# Patient Record
Sex: MALE | Race: WHITE | NOT HISPANIC OR LATINO | Employment: FULL TIME | URBAN - METROPOLITAN AREA
[De-identification: names, ages, dates, MRNs, and addresses within clinical notes are randomized per-mention and may not be internally consistent; named-entity substitution may affect disease eponyms.]

---

## 2020-08-25 ENCOUNTER — HISTORICAL (OUTPATIENT)
Dept: ENDOSCOPY | Facility: HOSPITAL | Age: 55
End: 2020-08-25

## 2020-09-24 ENCOUNTER — HISTORICAL (OUTPATIENT)
Dept: RADIOLOGY | Facility: HOSPITAL | Age: 55
End: 2020-09-24

## 2020-09-24 LAB — POC CREATININE: 1.1 MG/DL (ref 0.6–1.3)

## 2022-05-27 ENCOUNTER — OFFICE VISIT (OUTPATIENT)
Dept: ORTHOPEDICS | Facility: CLINIC | Age: 57
End: 2022-05-27
Payer: OTHER GOVERNMENT

## 2022-05-27 VITALS
SYSTOLIC BLOOD PRESSURE: 119 MMHG | DIASTOLIC BLOOD PRESSURE: 84 MMHG | BODY MASS INDEX: 35.79 KG/M2 | HEIGHT: 70 IN | HEART RATE: 63 BPM | WEIGHT: 250 LBS

## 2022-05-27 DIAGNOSIS — M50.20 CERVICAL DISC DISPLACEMENT: ICD-10-CM

## 2022-05-27 DIAGNOSIS — M54.12 CERVICAL RADICULITIS: Primary | ICD-10-CM

## 2022-05-27 DIAGNOSIS — M54.2 CERVICALGIA: Chronic | ICD-10-CM

## 2022-05-27 DIAGNOSIS — M48.02 CERVICAL SPINAL STENOSIS: ICD-10-CM

## 2022-05-27 DIAGNOSIS — M54.12 CERVICAL RADICULITIS: Primary | Chronic | ICD-10-CM

## 2022-05-27 DIAGNOSIS — M54.2 CERVICALGIA: ICD-10-CM

## 2022-05-27 DIAGNOSIS — M48.02 CERVICAL SPINAL STENOSIS: Chronic | ICD-10-CM

## 2022-05-27 DIAGNOSIS — M50.20 CERVICAL DISC DISPLACEMENT: Chronic | ICD-10-CM

## 2022-05-27 PROCEDURE — 99203 OFFICE O/P NEW LOW 30 MIN: CPT | Mod: ,,, | Performed by: ANESTHESIOLOGY

## 2022-05-27 PROCEDURE — 99203 PR OFFICE/OUTPT VISIT, NEW, LEVL III, 30-44 MIN: ICD-10-PCS | Mod: ,,, | Performed by: ANESTHESIOLOGY

## 2022-05-27 RX ORDER — AMLODIPINE BESYLATE 10 MG/1
10 TABLET ORAL
COMMUNITY

## 2022-05-27 RX ORDER — PANTOPRAZOLE SODIUM 40 MG/1
40 TABLET, DELAYED RELEASE ORAL
COMMUNITY

## 2022-05-27 RX ORDER — FENOFIBRATE 145 MG/1
1800 TABLET, FILM COATED ORAL
COMMUNITY

## 2022-05-27 RX ORDER — ROSUVASTATIN CALCIUM 40 MG/1
20 TABLET, COATED ORAL
COMMUNITY

## 2022-05-27 NOTE — PROGRESS NOTES
Petra Burkett MD        PATIENT NAME: Tani Mehta Jr.  : 1965  DATE: 22  MRN: [id number]      Billing Provider: Petra Burkett MD  Level of Service:   Patient PCP Information     Provider PCP Type    Ramón Granda MD General          Reason for Visit / Chief Complaint: Back Pain ([facility]; [facility] & neck pain. Pt states no prior injury/sx; pain started by 3 years ago with some neck pain radiating to shoulder and spine. Pt reports some numbness in arms mostly left, has tried OTC NSAIDs. Pain level 6 out of of 10. )       Update PCP  Update Chief Complaint         History of Present Illness / Problem Focused Workflow     Tani Mehta Jr. presents to the clinic with Back Pain ([facility]; back & neck pain. Pt states no prior injury/sx; pain started by 3 years ago with some neck pain radiating to shoulder and spine. Pt reports some numbness in arms mostly left, has tried OTC NSAIDs. Pain level 6 out of of 10. )     Neck pain for 3 years, no injury  Now radiating down LUE with daily numbness and tingling into left hand and all fingers; every now and then down RUE  OTC oral NSAIDs, topical Aleve, and Voltaren gel have not helped  Heating pad helps some  6/10 now; sometimes has no pain when he first wakes up; 9/10 worst  No previous neck surgery or injections, PT    Back Pain        Review of Systems     Review of Systems   Musculoskeletal: Positive for back pain, neck pain and neck stiffness.        Medical / Social / Family History     Past Medical History:   Diagnosis Date    Adrenal adenoma     Diverticulosis     DJD (degenerative joint disease)     Fatty liver     Hepatic cyst     HTN (hypertension) 2014    Hyperlipidemia     Hypertension     Hypothyroidism        Past Surgical History:   Procedure Laterality Date    MOUTH SURGERY         Social History  Mr. Mehta  reports that he has quit smoking. He has never used smokeless tobacco. He reports current alcohol use.  He reports that he does not use drugs.    Family History  'zaid Mehta family history includes Bladder Cancer in his brother; Breast cancer in his mother; Cancer in his mother; Heart disease in his father, maternal grandfather, and paternal grandmother; Hypertension in his brother, father, maternal grandfather, mother, paternal grandfather, and paternal grandmother; Lung cancer in his paternal grandfather; Multiple myeloma in his father; Parkinsonism in his mother; Pulmonary fibrosis in his maternal grandfather; Stroke in his maternal grandmother; Thyroid disease in his mother.    Medications and Allergies     Medications  Outpatient Medications Marked as Taking for the 5/27/22 encounter (Office Visit) with Petra Burkett MD   Medication Sig Dispense Refill    amLODIPine (NORVASC) 10 MG tablet Take 10 mg by mouth.      ATENOLOL ORAL Take 50 mg by mouth.      fenofibrate (TRICOR) 145 MG tablet Take 145 mg by mouth.      levothyroxine (SYNTHROID) 50 MCG tablet Take 50 mcg by mouth once daily.  4    pantoprazole (PROTONIX) 40 MG tablet Take 40 mg by mouth.      rosuvastatin (CRESTOR) 40 MG Tab Take 20 mg by mouth.         Allergies  Review of patient's allergies indicates:  No Known Allergies    Physical Examination     Vitals:    05/27/22 0949   BP: 119/84   Pulse: 63     Spine Musculoskeletal Exam    General        Constitutional: appears stated age, well-developed and well-nourished    Scleral icterus: no    Labored breathing: no    Psychiatric: normal mood and affect and no acute distress    Neurological: alert and oriented x3    Skin: intact    Inspection        Cervical Spine      Cervical spine inspection is normal.    Palpation      Cervical Spine      Masses: none    Spasms: none    Crepitus: none    Upper extremity muscle tone: normal    Tenderness: present      Spinous process: mid cervical and lower cervical    Range of Motion      Cervical Spine      Cervical flexion: 1-2 finger breadths     Cervical flexion - associated detail: pain    Cervical extension: reduced extension (51-75%)    Cervical extension - associated detail: pain    Strength      Cervical Spine      Cervical spine motor exam is normal.    Sensory      Cervical Spine      Cervical spine sensation is normal.       Assessment and Plan (including Health Maintenance)      Problem List  Smart Sets  Document Outside HM   :    Plan:   Cervical radiculitis    Cervicalgia    Cervical spinal stenosis    Cervical disc displacement       Schedule SLIM (C7-T1).    Problem List Items Addressed This Visit        Orthopedic Problems    Cervical disc displacement       Other    Cervical radiculitis - Primary    Cervicalgia    Cervical spinal stenosis            No future appointments.     There are no Patient Instructions on file for this visit.  No follow-ups on file.     Signature:  Petra Burkett MD      Date of encounter: 5/27/22

## 2022-05-27 NOTE — H&P (VIEW-ONLY)
Petra Burkett MD        PATIENT NAME: Tani Mehta Jr.  : 1965  DATE: 22  MRN: [id number]      Billing Provider: Petra Burkett MD  Level of Service:   Patient PCP Information     Provider PCP Type    Ramón Granda MD General          Reason for Visit / Chief Complaint: Back Pain ([facility]; back & [facility] pain. Pt states no prior injury/sx; pain started by 3 years ago with some neck pain radiating to shoulder and spine. Pt reports some numbness in arms mostly left, has tried OTC NSAIDs. Pain level 6 out of of 10. )       Update PCP  Update Chief Complaint         History of Present Illness / Problem Focused Workflow     Tani Mehta Jr. presents to the clinic with Back Pain ([facility]; back & neck pain. Pt states no prior injury/sx; pain started by 3 years ago with some neck pain radiating to shoulder and spine. Pt reports some numbness in arms mostly left, has tried OTC NSAIDs. Pain level 6 out of of 10. )     Neck pain for 3 years, no injury  Now radiating down LUE with daily numbness and tingling into left hand and all fingers; every now and then down RUE  OTC oral NSAIDs, topical Aleve, and Voltaren gel have not helped  Heating pad helps some  6/10 now; sometimes has no pain when he first wakes up; 9/10 worst  No previous neck surgery or injections, PT    Back Pain        Review of Systems     Review of Systems   Musculoskeletal: Positive for back pain, neck pain and neck stiffness.        Medical / Social / Family History     Past Medical History:   Diagnosis Date    Adrenal adenoma     Diverticulosis     DJD (degenerative joint disease)     Fatty liver     Hepatic cyst     HTN (hypertension) 2014    Hyperlipidemia     Hypertension     Hypothyroidism        Past Surgical History:   Procedure Laterality Date    MOUTH SURGERY         Social History  Mr. Mehta  reports that he has quit smoking. He has never used smokeless tobacco. He reports current alcohol use.  He reports that he does not use drugs.    Family History  'zaid Mehta family history includes Bladder Cancer in his brother; Breast cancer in his mother; Cancer in his mother; Heart disease in his father, maternal grandfather, and paternal grandmother; Hypertension in his brother, father, maternal grandfather, mother, paternal grandfather, and paternal grandmother; Lung cancer in his paternal grandfather; Multiple myeloma in his father; Parkinsonism in his mother; Pulmonary fibrosis in his maternal grandfather; Stroke in his maternal grandmother; Thyroid disease in his mother.    Medications and Allergies     Medications  Outpatient Medications Marked as Taking for the 5/27/22 encounter (Office Visit) with Petra Burkett MD   Medication Sig Dispense Refill    amLODIPine (NORVASC) 10 MG tablet Take 10 mg by mouth.      ATENOLOL ORAL Take 50 mg by mouth.      fenofibrate (TRICOR) 145 MG tablet Take 145 mg by mouth.      levothyroxine (SYNTHROID) 50 MCG tablet Take 50 mcg by mouth once daily.  4    pantoprazole (PROTONIX) 40 MG tablet Take 40 mg by mouth.      rosuvastatin (CRESTOR) 40 MG Tab Take 20 mg by mouth.         Allergies  Review of patient's allergies indicates:  No Known Allergies    Physical Examination     Vitals:    05/27/22 0949   BP: 119/84   Pulse: 63     Spine Musculoskeletal Exam    General        Constitutional: appears stated age, well-developed and well-nourished    Scleral icterus: no    Labored breathing: no    Psychiatric: normal mood and affect and no acute distress    Neurological: alert and oriented x3    Skin: intact    Inspection        Cervical Spine      Cervical spine inspection is normal.    Palpation      Cervical Spine      Masses: none    Spasms: none    Crepitus: none    Upper extremity muscle tone: normal    Tenderness: present      Spinous process: mid cervical and lower cervical    Range of Motion      Cervical Spine      Cervical flexion: 1-2 finger breadths     Cervical flexion - associated detail: pain    Cervical extension: reduced extension (51-75%)    Cervical extension - associated detail: pain    Strength      Cervical Spine      Cervical spine motor exam is normal.    Sensory      Cervical Spine      Cervical spine sensation is normal.       Assessment and Plan (including Health Maintenance)      Problem List  Smart Sets  Document Outside HM   :    Plan:   Cervical radiculitis    Cervicalgia    Cervical spinal stenosis    Cervical disc displacement       Schedule SLIM (C7-T1).    Problem List Items Addressed This Visit        Orthopedic Problems    Cervical disc displacement       Other    Cervical radiculitis - Primary    Cervicalgia    Cervical spinal stenosis            No future appointments.     There are no Patient Instructions on file for this visit.  No follow-ups on file.     Signature:  Petra Burkett MD      Date of encounter: 5/27/22

## 2022-06-07 ENCOUNTER — LAB VISIT (OUTPATIENT)
Dept: LAB | Facility: HOSPITAL | Age: 57
End: 2022-06-07
Attending: ANESTHESIOLOGY
Payer: OTHER GOVERNMENT

## 2022-06-07 DIAGNOSIS — Z01.818 PRE-OP TESTING: Primary | ICD-10-CM

## 2022-06-07 LAB — SARS-COV-2 RDRP RESP QL NAA+PROBE: NEGATIVE

## 2022-06-07 PROCEDURE — 87635 SARS-COV-2 COVID-19 AMP PRB: CPT

## 2022-06-07 NOTE — DISCHARGE INSTRUCTIONS
MEDIAL BRANCH BLOCK/ EPIDURAL STEROID INJECTION, CARE AFTER                                                                                    NO HEAVY LIFTING FOR ONE WEEK                                                                                  NO HEAT FOR 24 HOURS                                                                                  APPLY ICE PACK FOR COMFORT TO SITES                                                                                  REMOVE BAND-AIDS TOMORROW AND THEN YOU MAY SHOWER                                                                                  NO TUB SOAKING FOR 3-5 DAYS      These instructions give you information on caring for yourself after your procedure. Your doctor may also give you specific instructions. Call your doctor if you have any problems or questions after your procedure.     HOME CARE  Do not drive or use any machinery for the next 24 hours.  Do not do any hard physical activity for the next 24 hours  Do not use a heating pad in area of injection  You may go back to eating as usual    SIDE EFFECTS THAT COULD HAPPEN UP TO 4 HOURS AFTER THE INJECTION  If you have leg weakness or numbness, have someone help you walk. If the weakness or numbness does not go away, or if it gets worse go to the emergency department.  If you have dizziness, lie down right away. This usually helps. Sit up slowly and then when you have been sitting for a few minutes then stand up.  If you have a mild headache, drink fluids (especially drinks with caffeine) Call your doctor if the headache gets worse or persists.  When the numbing medicine wears off you may feel some discomfort where you had the shot. It usually only lasts for a few days. You may put ice over the injection site. Leave ice on for 20 minutes at a time and protect your skin during use.   You may feel minor back pain and stiffness at the site of the shot. Call your doctor if this pain gets worse or does not  improve. You may feel nauseous or vomit several hours after your procedure. If this happens, try drinking small amounts of clear liquids until you feel better. If you continue to feel nauseated or continue vomiting, get help right away.    Steroids may take several days to start working. The shot usually helps leg pain more than back pain. The shot will not fix what is causing the pain but may take away some of the pain. This pain relief may last from 2 weeks to 6 months.     GET HELP RIGHT AWAY IF:  You have very bad pain, headache or neck pain or stiffness  There is a change in your vision (double or blurry vision)  You have a fever over 101 or chills  You have swelling or redness at the injection site  You get weaker  You are not able to control your bladder or bowels  You are not able to urinate

## 2022-06-08 ENCOUNTER — ANESTHESIA EVENT (OUTPATIENT)
Dept: SURGERY | Facility: HOSPITAL | Age: 57
End: 2022-06-08
Payer: OTHER GOVERNMENT

## 2022-06-08 ENCOUNTER — ANESTHESIA (OUTPATIENT)
Dept: SURGERY | Facility: HOSPITAL | Age: 57
End: 2022-06-08
Payer: OTHER GOVERNMENT

## 2022-06-08 ENCOUNTER — HOSPITAL ENCOUNTER (OUTPATIENT)
Facility: HOSPITAL | Age: 57
Discharge: HOME | End: 2022-06-08
Attending: ANESTHESIOLOGY | Admitting: ANESTHESIOLOGY
Payer: OTHER GOVERNMENT

## 2022-06-08 DIAGNOSIS — M54.9 CHRONIC BACK PAIN GREATER THAN 3 MONTHS DURATION: ICD-10-CM

## 2022-06-08 DIAGNOSIS — G89.29 CHRONIC BACK PAIN GREATER THAN 3 MONTHS DURATION: ICD-10-CM

## 2022-06-08 PROCEDURE — 25000003 PHARM REV CODE 250: Performed by: NURSE ANESTHETIST, CERTIFIED REGISTERED

## 2022-06-08 PROCEDURE — 63600175 PHARM REV CODE 636 W HCPCS: Performed by: ANESTHESIOLOGY

## 2022-06-08 PROCEDURE — 62321 NJX INTERLAMINAR CRV/THRC: CPT | Performed by: ANESTHESIOLOGY

## 2022-06-08 PROCEDURE — 62321 PR INJ CERV/THORAC, W/GUIDANCE: ICD-10-PCS | Mod: ,,, | Performed by: ANESTHESIOLOGY

## 2022-06-08 PROCEDURE — 25000003 PHARM REV CODE 250: Performed by: ANESTHESIOLOGY

## 2022-06-08 PROCEDURE — 62321 NJX INTERLAMINAR CRV/THRC: CPT | Mod: ,,, | Performed by: ANESTHESIOLOGY

## 2022-06-08 PROCEDURE — 01992 ANES DX/THER NRV BLK&INJ PRN: CPT | Performed by: ANESTHESIOLOGY

## 2022-06-08 PROCEDURE — A4216 STERILE WATER/SALINE, 10 ML: HCPCS | Performed by: ANESTHESIOLOGY

## 2022-06-08 PROCEDURE — 25500020 PHARM REV CODE 255: Performed by: ANESTHESIOLOGY

## 2022-06-08 PROCEDURE — 37000008 HC ANESTHESIA 1ST 15 MINUTES: Performed by: ANESTHESIOLOGY

## 2022-06-08 PROCEDURE — 63600175 PHARM REV CODE 636 W HCPCS: Performed by: NURSE ANESTHETIST, CERTIFIED REGISTERED

## 2022-06-08 RX ORDER — LIDOCAINE HYDROCHLORIDE 10 MG/ML
INJECTION, SOLUTION EPIDURAL; INFILTRATION; INTRACAUDAL; PERINEURAL
Status: DISCONTINUED | OUTPATIENT
Start: 2022-06-08 | End: 2022-06-08 | Stop reason: HOSPADM

## 2022-06-08 RX ORDER — LIDOCAINE HYDROCHLORIDE 20 MG/ML
INJECTION, SOLUTION EPIDURAL; INFILTRATION; INTRACAUDAL; PERINEURAL
Status: DISCONTINUED | OUTPATIENT
Start: 2022-06-08 | End: 2022-06-08

## 2022-06-08 RX ORDER — IOPAMIDOL 408 MG/ML
1.5 INJECTION, SOLUTION INTRATHECAL
Status: COMPLETED | OUTPATIENT
Start: 2022-06-08 | End: 2022-06-08

## 2022-06-08 RX ORDER — LIDOCAINE HYDROCHLORIDE 10 MG/ML
1 INJECTION, SOLUTION EPIDURAL; INFILTRATION; INTRACAUDAL; PERINEURAL ONCE
Status: DISCONTINUED | OUTPATIENT
Start: 2022-06-08 | End: 2022-06-08 | Stop reason: HOSPADM

## 2022-06-08 RX ORDER — DEXAMETHASONE SODIUM PHOSPHATE 10 MG/ML
INJECTION INTRAMUSCULAR; INTRAVENOUS
Status: DISCONTINUED | OUTPATIENT
Start: 2022-06-08 | End: 2022-06-08 | Stop reason: HOSPADM

## 2022-06-08 RX ORDER — SODIUM CHLORIDE, SODIUM GLUCONATE, SODIUM ACETATE, POTASSIUM CHLORIDE AND MAGNESIUM CHLORIDE 30; 37; 368; 526; 502 MG/100ML; MG/100ML; MG/100ML; MG/100ML; MG/100ML
1000 INJECTION, SOLUTION INTRAVENOUS CONTINUOUS
Status: DISCONTINUED | OUTPATIENT
Start: 2022-06-08 | End: 2022-06-08 | Stop reason: HOSPADM

## 2022-06-08 RX ORDER — PROPOFOL 10 MG/ML
VIAL (ML) INTRAVENOUS
Status: DISCONTINUED | OUTPATIENT
Start: 2022-06-08 | End: 2022-06-08

## 2022-06-08 RX ORDER — DEXAMETHASONE SODIUM PHOSPHATE 10 MG/ML
INJECTION INTRAMUSCULAR; INTRAVENOUS
Status: DISCONTINUED
Start: 2022-06-08 | End: 2022-06-08 | Stop reason: HOSPADM

## 2022-06-08 RX ORDER — LIDOCAINE HYDROCHLORIDE 10 MG/ML
INJECTION, SOLUTION EPIDURAL; INFILTRATION; INTRACAUDAL; PERINEURAL
Status: DISCONTINUED
Start: 2022-06-08 | End: 2022-06-08 | Stop reason: HOSPADM

## 2022-06-08 RX ORDER — ASPIRIN 81 MG/1
81 TABLET ORAL DAILY
Status: ON HOLD | COMMUNITY
End: 2022-07-13 | Stop reason: CLARIF

## 2022-06-08 RX ORDER — SODIUM CHLORIDE 9 MG/ML
INJECTION, SOLUTION INTRAMUSCULAR; INTRAVENOUS; SUBCUTANEOUS
Status: DISCONTINUED | OUTPATIENT
Start: 2022-06-08 | End: 2022-06-08 | Stop reason: HOSPADM

## 2022-06-08 RX ADMIN — PROPOFOL 100 MG: 10 INJECTION, EMULSION INTRAVENOUS at 12:06

## 2022-06-08 RX ADMIN — SODIUM CHLORIDE, SODIUM GLUCONATE, SODIUM ACETATE, POTASSIUM CHLORIDE AND MAGNESIUM CHLORIDE 1000 ML: 526; 502; 368; 37; 30 INJECTION, SOLUTION INTRAVENOUS at 11:06

## 2022-06-08 RX ADMIN — LIDOCAINE HYDROCHLORIDE 50 MG: 20 INJECTION, SOLUTION EPIDURAL; INFILTRATION; INTRACAUDAL; PERINEURAL at 12:06

## 2022-06-08 NOTE — PLAN OF CARE
Preparing patient for discharge. Pain is minimal. Vital signs are stable. Patient and family verbalize understanding of discharge criteria.

## 2022-06-08 NOTE — OP NOTE
Cervical Epidural Steroid Injection (C7-T1)    Pre-Procedure Diagnoses:  1. Chronic pain syndrome  2. Cervicalgia  3. Cervical radiculopathy  4. Cervical disc displacement    Post-Procedure Diagnoses:  1. Chronic pain syndrome  2. Cervicalgia  3. Cervical radiculopathy  4. Cervical disc displacement    Anesthesia:  Local and MAC    Estimated Blood Loss:  None    Complications:  None    Informed Consent:  The procedure, risks, benefits, and alternatives were discussed with the patient. There were no contraindications to the procedure. The patient expressed understanding and agreed to proceed. Fully informed written consent was obtained.     Description of the Procedure:  The patient was taken to the operating room. IV access was obtained prior to the start of the procedure. The patient was positioned prone on the fluoroscopy table. Continuous hemodynamic monitoring was initiated and continued throughout the duration of the procedure. The skin overlying the cervicothoracic spine was prepped with Chloraprep and draped into a sterile field. Fluoroscopy was used to identify the location of the C7-T1 interspace. Skin anesthesia was achieved using 3 mL of 1% lidocaine. An 18 gauge 3.5 inch Tuohy needle was slowly inserted and advanced under intermittent fluoroscopy and into the epidural space by loss of resistance to saline. Proper needle position was confirmed under AP, oblique, and lateral fluoroscopic views. Negative aspiration for blood or CSF was confirmed. 1 mL of Isovue contrast was injected. Fluoroscopic imaging revealed a clear spread of agent from C5 to C7. Then a combination of 10 mg of dexamethasone and 2 mL of preservative-free normal saline was easily injected. There was no pain on injection. The needle was removed intact and bleeding was nil. Sterile bandages were applied. The patient was taken to the recovery room for further observation in stable condition. The patient was then discharged home without any  complications.

## 2022-06-08 NOTE — TRANSFER OF CARE
"Anesthesia Transfer of Care Note    Patient: Tani Mehta Jr.    Procedure(s) Performed: Procedure(s) (LRB):  Injection-steroid-epidural-cervical (N/A)    Patient location: OPS    Anesthesia Type: MAC    Transport from OR: Transported from OR on room air with adequate spontaneous ventilation    Post pain: adequate analgesia    Post assessment: no apparent anesthetic complications    Post vital signs: stable    Level of consciousness: sedated    Complications: none    Transfer of care protocol was followed      Last vitals:   Visit Vitals  /66   Pulse 68   Temp 36.3 °C (97.3 °F) (Tympanic)   Resp 20   Ht 5' 10" (1.778 m)   Wt 116.7 kg (257 lb 4.4 oz)   SpO2 96%   BMI 36.92 kg/m²     "

## 2022-06-08 NOTE — PLAN OF CARE
Preparing patient for discharge. Pain is minimal. Vital signs stable. Patient and family verbalize understanding of discharge instructions

## 2022-06-08 NOTE — ANESTHESIA POSTPROCEDURE EVALUATION
Anesthesia Post Evaluation    Patient: Tani Mehta Jr.    Procedure(s) Performed: Procedure(s) (LRB):  Injection-steroid-epidural-cervical (N/A)          Patient location during evaluation: PACU  Post-procedure mental status: @ basline.  Post-procedure vital signs: reviewed and stable  Pain management: adequate      Anesthetic complications: no      Cardiovascular status: blood pressure returned to baseline  Respiratory status: @ baseline.  Hydration status: euvolemic            Vitals Value Taken Time   /89 06/08/22 1315   Temp 98 06/08/22 1349   Pulse 63 06/08/22 1315   Resp 18 06/08/22 1315   SpO2 98 % 06/08/22 1315   Vitals shown include unvalidated device data.      No case tracking events are documented in the log.      Pain/Betsy Score: Modified Betsy Score: 20 (6/8/2022  1:20 PM)

## 2022-06-08 NOTE — ANESTHESIA PREPROCEDURE EVALUATION
06/08/2022  Tani Mehta Jr. is a 56 y.o., male.      Pre-op Assessment    I have reviewed the Patient Summary Reports.     I have reviewed the Nursing Notes. I have reviewed the NPO Status.   I have reviewed the Medications.     Review of Systems      Physical Exam  General: Well nourished    Airway:  Mouth Opening: Normal        Anesthesia Plan  Type of Anesthesia, risks & benefits discussed:    Anesthesia Type: Gen Natural Airway  Intra-op Monitoring Plan: Standard ASA Monitors  Induction:  IV  Informed Consent: Informed consent signed with the Patient and all parties understand the risks and agree with anesthesia plan.  All questions answered.   ASA Score: 2  Day of Surgery Review of History & Physical: H&P Update referred to the surgeon/provider.  Anesthesia Plan Notes: Pt is here for a pain injection.  These usually only require MAC anesthesia.  Pain doctor requests IV GA.  Will provide this using IV propofol as needed.  The pain doctor has reviewed the patients meds including any anticoagulant meds.      Ready For Surgery From Anesthesia Perspective.     .

## 2022-06-08 NOTE — DISCHARGE SUMMARY
[facility] - Periop Services  Discharge Note  Short Stay    Procedure(s) (LRB):  Injection-steroid-epidural-cervical (N/A)    OUTCOME: Patient tolerated treatment/procedure well without complication and is now ready for discharge.    DISPOSITION: Home or Self Care    FINAL DIAGNOSIS:  <principal problem not specified>    FOLLOWUP: In clinic    DISCHARGE INSTRUCTIONS:  No discharge procedures on file.     TIME SPENT ON DISCHARGE: 5 minutes

## 2022-06-09 VITALS
TEMPERATURE: 97 F | WEIGHT: 257.25 LBS | HEART RATE: 60 BPM | SYSTOLIC BLOOD PRESSURE: 124 MMHG | HEIGHT: 70 IN | DIASTOLIC BLOOD PRESSURE: 89 MMHG | RESPIRATION RATE: 18 BRPM | OXYGEN SATURATION: 98 % | BODY MASS INDEX: 36.83 KG/M2

## 2022-06-24 ENCOUNTER — OFFICE VISIT (OUTPATIENT)
Dept: ORTHOPEDICS | Facility: CLINIC | Age: 57
End: 2022-06-24
Payer: OTHER GOVERNMENT

## 2022-06-24 VITALS
DIASTOLIC BLOOD PRESSURE: 86 MMHG | HEIGHT: 70 IN | HEART RATE: 76 BPM | WEIGHT: 257 LBS | SYSTOLIC BLOOD PRESSURE: 122 MMHG | BODY MASS INDEX: 36.79 KG/M2

## 2022-06-24 DIAGNOSIS — M48.02 CERVICAL SPINAL STENOSIS: ICD-10-CM

## 2022-06-24 DIAGNOSIS — M54.2 CERVICALGIA: ICD-10-CM

## 2022-06-24 DIAGNOSIS — M50.20 CERVICAL DISC DISPLACEMENT: ICD-10-CM

## 2022-06-24 DIAGNOSIS — M54.12 CERVICAL RADICULITIS: Primary | ICD-10-CM

## 2022-06-24 PROCEDURE — 99213 PR OFFICE/OUTPT VISIT, EST, LEVL III, 20-29 MIN: ICD-10-PCS | Mod: ,,, | Performed by: ANESTHESIOLOGY

## 2022-06-24 PROCEDURE — 99213 OFFICE O/P EST LOW 20 MIN: CPT | Mod: ,,, | Performed by: ANESTHESIOLOGY

## 2022-06-24 NOTE — H&P (VIEW-ONLY)
Petra Burkett MD        PATIENT NAME: Tani Mehta Jr.  : 1965  DATE: 22  MRN: [id number]      Billing Provider: Petra Burkett MD  Level of Service:   Patient PCP Information     Provider PCP Type    Ramón Granda MD General          Reason for Visit / Chief Complaint: Follow-up (2 wk post C7-T1 SLIM, 22. Pt states he had some relief for about 1 one day, c/o pain & tingling down left arm, up & down back, pain increases throughout the day. Pain level 7 out of 10. )       Update PCP  Update Chief Complaint         History of Present Illness / Problem Focused Workflow     Tani Mehta Jr. presents to the clinic with Follow-up (2 wk post C7-T1 SLIM, 22. Pt states he had some relief for about 1 one day, c/o pain & tingling down left arm, up & down back, pain increases throughout the day. Pain level 7 out of 10. )     Neck pain for 3 years, no injury  Now radiating down LUE with daily numbness and tingling into left hand and all fingers; every now and then down RUE  OTC oral NSAIDs, topical Aleve, and Voltaren gel have not helped  Heating pad helps some  6/10 now; sometimes has no pain when he first wakes up; 9/10 worst  No previous neck surgery or injections, PT    Back Pain    Follow-up  Associated symptoms include neck pain.       Review of Systems     Review of Systems   Musculoskeletal: Positive for back pain, neck pain and neck stiffness.        Medical / Social / Family History     Past Medical History:   Diagnosis Date    Adrenal adenoma     Diverticulosis     DJD (degenerative joint disease)     Fatty liver     Hepatic cyst     HTN (hypertension) 2014    Hyperlipidemia     Hypertension     Hypothyroidism     IBS (irritable bowel syndrome)     Sleep apnea        Past Surgical History:   Procedure Laterality Date    CATARACT EXTRACTION EXTRACAPSULAR W/ INTRAOCULAR LENS IMPLANTATION Bilateral     COLONOSCOPY      EPIDURAL STEROID INJECTION INTO CERVICAL  SPINE N/A 6/8/2022    Procedure: Injection-steroid-epidural-cervical;  Surgeon: Petra Burkett MD;  Location: [facility];  Service: Pain Management;  Laterality: N/A;  C7-T1 SLIM    MOUTH SURGERY      dental implants       Social History  Mr. Mehta  reports that he has quit smoking. He has never used smokeless tobacco. He reports current alcohol use of about 12.0 standard drinks of alcohol per week. He reports that he does not use drugs.    Family History  Mr.'s Mehta family history includes Bladder Cancer in his brother; Breast cancer in his mother; Cancer in his mother; Heart disease in his father, maternal grandfather, and paternal grandmother; Hypertension in his brother, father, maternal grandfather, mother, paternal grandfather, and paternal grandmother; Lung cancer in his paternal grandfather; Multiple myeloma in his father; Parkinsonism in his mother; Pulmonary fibrosis in his maternal grandfather; Stroke in his maternal grandmother; Thyroid disease in his mother.    Medications and Allergies     Medications  Outpatient Medications Marked as Taking for the 6/24/22 encounter (Office Visit) with Petra Burkett MD   Medication Sig Dispense Refill    amLODIPine (NORVASC) 10 MG tablet Take 10 mg by mouth.      ATENOLOL ORAL Take 50 mg by mouth.      fenofibrate (TRICOR) 145 MG tablet Take 1,800 mg by mouth.      levothyroxine (SYNTHROID) 50 MCG tablet Take 50 mcg by mouth once daily.  4    pantoprazole (PROTONIX) 40 MG tablet Take 40 mg by mouth.      rosuvastatin (CRESTOR) 40 MG Tab Take 20 mg by mouth.         Allergies  Review of patient's allergies indicates:  No Known Allergies    Physical Examination     Vitals:    06/24/22 1008   BP: 122/86   Pulse: 76     Spine Musculoskeletal Exam    General        Constitutional: appears stated age, well-developed and well-nourished    Scleral icterus: no    Labored breathing: no    Psychiatric: normal mood and affect and no acute distress    Neurological:  alert and oriented x3    Skin: intact    Inspection        Cervical Spine      Cervical spine inspection is normal.    Palpation      Cervical Spine      Masses: none    Spasms: none    Crepitus: none    Upper extremity muscle tone: normal    Tenderness: present      Spinous process: mid cervical and lower cervical    Range of Motion      Cervical Spine      Cervical flexion: 1-2 finger breadths    Cervical flexion - associated detail: pain    Cervical extension: reduced extension (51-75%)    Cervical extension - associated detail: pain    Strength      Cervical Spine      Cervical spine motor exam is normal.    Sensory      Cervical Spine      Cervical spine sensation is normal.       Assessment and Plan (including Health Maintenance)      Problem List  Smart Sets  Document Outside HM   :    Plan:   Cervical radiculitis    Cervical spinal stenosis    Cervical disc displacement    Cervicalgia       Schedule SLIM (C7-T1) and refer to PT to start after injection.      Problem List Items Addressed This Visit        Orthopedic Problems    Cervical disc displacement       Other    Cervical radiculitis - Primary    Cervicalgia    Cervical spinal stenosis            No future appointments.     There are no Patient Instructions on file for this visit.  No follow-ups on file.     Signature:  Petra Burkett MD      Date of encounter: 6/24/22

## 2022-06-24 NOTE — PROGRESS NOTES
Petra Burkett MD        PATIENT NAME: Tani Mehta Jr.  : 1965  DATE: 22  MRN: [id number]      Billing Provider: Petra Burkett MD  Level of Service:   Patient PCP Information     Provider PCP Type    Ramón Granda MD General          Reason for Visit / Chief Complaint: Follow-up (2 wk post C7-T1 SLIM, 22. Pt states he had some relief for about 1 one day, c/o pain & tingling down left arm, up & down back, pain increases throughout the day. Pain level 7 out of 10. )       Update PCP  Update Chief Complaint         History of Present Illness / Problem Focused Workflow     Tani Mehta Jr. presents to the clinic with Follow-up (2 wk post C7-T1 SLIM, 22. Pt states he had some relief for about 1 one day, c/o pain & tingling down left arm, up & down back, pain increases throughout the day. Pain level 7 out of 10. )     Neck pain for 3 years, no injury  Now radiating down LUE with daily numbness and tingling into left hand and all fingers; every now and then down RUE  OTC oral NSAIDs, topical Aleve, and Voltaren gel have not helped  Heating pad helps some  6/10 now; sometimes has no pain when he first wakes up; 9/10 worst  No previous neck surgery or injections, PT    Back Pain    Follow-up  Associated symptoms include neck pain.       Review of Systems     Review of Systems   Musculoskeletal: Positive for back pain, neck pain and neck stiffness.        Medical / Social / Family History     Past Medical History:   Diagnosis Date    Adrenal adenoma     Diverticulosis     DJD (degenerative joint disease)     Fatty liver     Hepatic cyst     HTN (hypertension) 2014    Hyperlipidemia     Hypertension     Hypothyroidism     IBS (irritable bowel syndrome)     Sleep apnea        Past Surgical History:   Procedure Laterality Date    CATARACT EXTRACTION EXTRACAPSULAR W/ INTRAOCULAR LENS IMPLANTATION Bilateral     COLONOSCOPY      EPIDURAL STEROID INJECTION INTO CERVICAL  SPINE N/A 6/8/2022    Procedure: Injection-steroid-epidural-cervical;  Surgeon: Petra Burkett MD;  Location: [facility];  Service: Pain Management;  Laterality: N/A;  C7-T1 SLIM    MOUTH SURGERY      dental implants       Social History  Mr. Mehta  reports that he has quit smoking. He has never used smokeless tobacco. He reports current alcohol use of about 12.0 standard drinks of alcohol per week. He reports that he does not use drugs.    Family History  Mr.'s Mehta family history includes Bladder Cancer in his brother; Breast cancer in his mother; Cancer in his mother; Heart disease in his father, maternal grandfather, and paternal grandmother; Hypertension in his brother, father, maternal grandfather, mother, paternal grandfather, and paternal grandmother; Lung cancer in his paternal grandfather; Multiple myeloma in his father; Parkinsonism in his mother; Pulmonary fibrosis in his maternal grandfather; Stroke in his maternal grandmother; Thyroid disease in his mother.    Medications and Allergies     Medications  Outpatient Medications Marked as Taking for the 6/24/22 encounter (Office Visit) with Petra Burkett MD   Medication Sig Dispense Refill    amLODIPine (NORVASC) 10 MG tablet Take 10 mg by mouth.      ATENOLOL ORAL Take 50 mg by mouth.      fenofibrate (TRICOR) 145 MG tablet Take 1,800 mg by mouth.      levothyroxine (SYNTHROID) 50 MCG tablet Take 50 mcg by mouth once daily.  4    pantoprazole (PROTONIX) 40 MG tablet Take 40 mg by mouth.      rosuvastatin (CRESTOR) 40 MG Tab Take 20 mg by mouth.         Allergies  Review of patient's allergies indicates:  No Known Allergies    Physical Examination     Vitals:    06/24/22 1008   BP: 122/86   Pulse: 76     Spine Musculoskeletal Exam    General        Constitutional: appears stated age, well-developed and well-nourished    Scleral icterus: no    Labored breathing: no    Psychiatric: normal mood and affect and no acute distress    Neurological:  alert and oriented x3    Skin: intact    Inspection        Cervical Spine      Cervical spine inspection is normal.    Palpation      Cervical Spine      Masses: none    Spasms: none    Crepitus: none    Upper extremity muscle tone: normal    Tenderness: present      Spinous process: mid cervical and lower cervical    Range of Motion      Cervical Spine      Cervical flexion: 1-2 finger breadths    Cervical flexion - associated detail: pain    Cervical extension: reduced extension (51-75%)    Cervical extension - associated detail: pain    Strength      Cervical Spine      Cervical spine motor exam is normal.    Sensory      Cervical Spine      Cervical spine sensation is normal.       Assessment and Plan (including Health Maintenance)      Problem List  Smart Sets  Document Outside HM   :    Plan:   Cervical radiculitis    Cervical spinal stenosis    Cervical disc displacement    Cervicalgia       Schedule SLIM (C7-T1) and refer to PT to start after injection.      Problem List Items Addressed This Visit        Orthopedic Problems    Cervical disc displacement       Other    Cervical radiculitis - Primary    Cervicalgia    Cervical spinal stenosis            No future appointments.     There are no Patient Instructions on file for this visit.  No follow-ups on file.     Signature:  Petra Burkett MD      Date of encounter: 6/24/22

## 2022-07-12 ENCOUNTER — ANESTHESIA EVENT (OUTPATIENT)
Dept: SURGERY | Facility: HOSPITAL | Age: 57
End: 2022-07-12
Payer: OTHER GOVERNMENT

## 2022-07-12 ENCOUNTER — APPOINTMENT (OUTPATIENT)
Dept: LAB | Facility: HOSPITAL | Age: 57
End: 2022-07-12
Attending: ANESTHESIOLOGY
Payer: OTHER GOVERNMENT

## 2022-07-12 DIAGNOSIS — Z01.818 PRE-OP TESTING: Primary | ICD-10-CM

## 2022-07-12 LAB — SARS-COV-2 RDRP RESP QL NAA+PROBE: NEGATIVE

## 2022-07-12 PROCEDURE — 87635 SARS-COV-2 COVID-19 AMP PRB: CPT

## 2022-07-13 ENCOUNTER — ANESTHESIA (OUTPATIENT)
Dept: SURGERY | Facility: HOSPITAL | Age: 57
End: 2022-07-13
Payer: OTHER GOVERNMENT

## 2022-07-13 ENCOUNTER — HOSPITAL ENCOUNTER (OUTPATIENT)
Facility: HOSPITAL | Age: 57
Discharge: HOME | End: 2022-07-13
Attending: ANESTHESIOLOGY | Admitting: ANESTHESIOLOGY
Payer: OTHER GOVERNMENT

## 2022-07-13 DIAGNOSIS — G89.29 CHRONIC NECK PAIN: ICD-10-CM

## 2022-07-13 DIAGNOSIS — M54.2 CHRONIC NECK PAIN: ICD-10-CM

## 2022-07-13 PROCEDURE — 63600175 PHARM REV CODE 636 W HCPCS: Performed by: NURSE ANESTHETIST, CERTIFIED REGISTERED

## 2022-07-13 PROCEDURE — 62321 NJX INTERLAMINAR CRV/THRC: CPT | Mod: ,,, | Performed by: ANESTHESIOLOGY

## 2022-07-13 PROCEDURE — 62321 PR INJ CERV/THORAC, W/GUIDANCE: ICD-10-PCS | Mod: ,,, | Performed by: ANESTHESIOLOGY

## 2022-07-13 PROCEDURE — 37000008 HC ANESTHESIA 1ST 15 MINUTES: Performed by: ANESTHESIOLOGY

## 2022-07-13 PROCEDURE — A4216 STERILE WATER/SALINE, 10 ML: HCPCS | Performed by: ANESTHESIOLOGY

## 2022-07-13 PROCEDURE — 25500020 PHARM REV CODE 255: Performed by: ANESTHESIOLOGY

## 2022-07-13 PROCEDURE — 63600175 PHARM REV CODE 636 W HCPCS: Performed by: ANESTHESIOLOGY

## 2022-07-13 PROCEDURE — 25000003 PHARM REV CODE 250: Performed by: ANESTHESIOLOGY

## 2022-07-13 PROCEDURE — 01992 ANES DX/THER NRV BLK&INJ PRN: CPT | Performed by: ANESTHESIOLOGY

## 2022-07-13 PROCEDURE — 62321 NJX INTERLAMINAR CRV/THRC: CPT | Performed by: ANESTHESIOLOGY

## 2022-07-13 RX ORDER — DEXAMETHASONE SODIUM PHOSPHATE 10 MG/ML
INJECTION INTRAMUSCULAR; INTRAVENOUS
Status: DISCONTINUED | OUTPATIENT
Start: 2022-07-13 | End: 2022-07-13 | Stop reason: HOSPADM

## 2022-07-13 RX ORDER — BUPIVACAINE HYDROCHLORIDE 2.5 MG/ML
INJECTION, SOLUTION EPIDURAL; INFILTRATION; INTRACAUDAL
Status: DISCONTINUED
Start: 2022-07-13 | End: 2022-07-13 | Stop reason: WASHOUT

## 2022-07-13 RX ORDER — IOPAMIDOL 408 MG/ML
10 INJECTION, SOLUTION INTRATHECAL
Status: COMPLETED | OUTPATIENT
Start: 2022-07-13 | End: 2022-07-13

## 2022-07-13 RX ORDER — SODIUM CHLORIDE, SODIUM LACTATE, POTASSIUM CHLORIDE, CALCIUM CHLORIDE 600; 310; 30; 20 MG/100ML; MG/100ML; MG/100ML; MG/100ML
INJECTION, SOLUTION INTRAVENOUS CONTINUOUS
Status: DISCONTINUED | OUTPATIENT
Start: 2022-07-13 | End: 2022-07-13 | Stop reason: HOSPADM

## 2022-07-13 RX ORDER — SODIUM CHLORIDE 9 MG/ML
INJECTION, SOLUTION INTRAMUSCULAR; INTRAVENOUS; SUBCUTANEOUS
Status: DISCONTINUED | OUTPATIENT
Start: 2022-07-13 | End: 2022-07-13 | Stop reason: HOSPADM

## 2022-07-13 RX ORDER — DEXAMETHASONE SODIUM PHOSPHATE 10 MG/ML
INJECTION INTRAMUSCULAR; INTRAVENOUS
Status: DISCONTINUED
Start: 2022-07-13 | End: 2022-07-13 | Stop reason: HOSPADM

## 2022-07-13 RX ORDER — LIDOCAINE HYDROCHLORIDE 10 MG/ML
INJECTION, SOLUTION EPIDURAL; INFILTRATION; INTRACAUDAL; PERINEURAL
Status: DISCONTINUED | OUTPATIENT
Start: 2022-07-13 | End: 2022-07-13 | Stop reason: HOSPADM

## 2022-07-13 RX ORDER — PROPOFOL 10 MG/ML
VIAL (ML) INTRAVENOUS
Status: DISCONTINUED | OUTPATIENT
Start: 2022-07-13 | End: 2022-07-13

## 2022-07-13 RX ORDER — LIDOCAINE HYDROCHLORIDE 10 MG/ML
1 INJECTION, SOLUTION EPIDURAL; INFILTRATION; INTRACAUDAL; PERINEURAL ONCE
Status: DISCONTINUED | OUTPATIENT
Start: 2022-07-13 | End: 2022-07-13 | Stop reason: HOSPADM

## 2022-07-13 RX ORDER — LIDOCAINE HYDROCHLORIDE 10 MG/ML
INJECTION, SOLUTION EPIDURAL; INFILTRATION; INTRACAUDAL; PERINEURAL
Status: DISCONTINUED
Start: 2022-07-13 | End: 2022-07-13 | Stop reason: HOSPADM

## 2022-07-13 RX ADMIN — PROPOFOL 30 MG: 10 INJECTION, EMULSION INTRAVENOUS at 11:07

## 2022-07-13 RX ADMIN — PROPOFOL 70 MG: 10 INJECTION, EMULSION INTRAVENOUS at 11:07

## 2022-07-13 RX ADMIN — PROPOFOL 20 MG: 10 INJECTION, EMULSION INTRAVENOUS at 11:07

## 2022-07-13 NOTE — ANESTHESIA POSTPROCEDURE EVALUATION
Anesthesia Post Evaluation    Patient: Tani Mehta Jr.    Procedure(s) Performed: Procedure(s) (LRB):  INJECTION, STEROID, SPINE, CERVICAL, EPIDURAL - C7-T1 SLIM (N/A)    Final Anesthesia Type: general      Patient location during evaluation: OPS  Patient participation: Yes- Able to Participate  Level of consciousness: awake and oriented  Post-procedure vital signs: reviewed and stable  Pain management: adequate  Airway patency: patent    PONV status at discharge: No PONV  Anesthetic complications: no      Cardiovascular status: blood pressure returned to baseline and stable  Respiratory status: unassisted and spontaneous ventilation  Hydration status: euvolemic  Follow-up not needed.          Vitals Value Taken Time   /92 07/13/22 1216   Temp 36.5 07/13/22 1252   Pulse 70 07/13/22 1223   Resp 20 07/13/22 1212   SpO2 97 % 07/13/22 1223   Vitals shown include unvalidated device data.      No case tracking events are documented in the log.      Pain/Betsy Score: Betsy Score: 10 (7/13/2022 12:35 PM)  Modified Betsy Score: 20 (7/13/2022 12:35 PM)

## 2022-07-13 NOTE — TRANSFER OF CARE
"Anesthesia Transfer of Care Note    Patient: Tani Mehta Jr.    Procedure(s) Performed: Procedure(s) (LRB):  INJECTION, STEROID, SPINE, CERVICAL, EPIDURAL - C7-T1 SLIM (N/A)    Patient location: PACU    Anesthesia Type: general    Transport from OR: Transported from OR on room air with adequate spontaneous ventilation    Post pain: adequate analgesia    Post assessment: no apparent anesthetic complications    Post vital signs: stable    Level of consciousness: sedated, awake and responds to stimulation    Nausea/Vomiting: no nausea/vomiting    Complications: none    Transfer of care protocol was followed      Last vitals:   Visit Vitals  /85   Pulse 76   Temp 36.4 °C (97.5 °F) (Tympanic)   Resp 18   Ht 5' 10" (1.778 m)   Wt 116.5 kg (256 lb 13.4 oz)   SpO2 100%   BMI 36.85 kg/m²     "

## 2022-07-13 NOTE — ANESTHESIA PREPROCEDURE EVALUATION
07/13/2022  Tani Mehta Jr. is a 57 y.o., male , who presents for the following:    Procedure: INJECTION, STEROID, SPINE, CERVICAL, EPIDURAL - C7-T1 SLIM (N/A Spine Cervical)   Anesthesia type: Local MAC   Diagnosis:        Cervical radiculitis [M54.12]       Cervical spinal stenosis [M48.02]       Cervical disc displacement [M50.20]       Cervicalgia [M54.2]     ** + Brief Postop Nausea after last Cervical Injection      Pre-op Assessment    I have reviewed the Patient Summary Reports.    I have reviewed the NPO Status.   I have reviewed the Medications.     Review of Systems  Anesthesia Hx:  No problems with previous Anesthesia Hx of Anesthetic complications Mild PONV   Social:  Former Smoker    Cardiovascular:   Hypertension    Pulmonary:   Sleep Apnea BiPAP   Hepatic/GI:   Liver Disease, Fatty Liver DZ   Neurological:   Cervical Radiculitis   Endocrine:   Hypothyroidism Adrenal Adenoma       Physical Exam  General: Alert and Oriented    Airway:  Mallampati: II   Mouth Opening: Normal  TM Distance: Normal  Neck: Girth Increased  Short Neck  Dental:  Intact    Chest/Lungs:  Normal Respiratory Rate    Heart:  Rate: Normal  Rhythm: Regular Rhythm        Anesthesia Plan  Type of Anesthesia, risks & benefits discussed:    Anesthesia Type: Gen Natural Airway  Intra-op Monitoring Plan: Standard ASA Monitors  Post Op Pain Control Plan: IV/PO Opioids PRN  Induction:  IV  Airway Plan: Direct  Informed Consent: Informed consent signed with the Patient and all parties understand the risks and agree with anesthesia plan.  All questions answered. Patient consented to blood products? No  ASA Score: 3  Day of Surgery Review of History & Physical: H&P Update referred to the surgeon/provider.  Anesthesia Plan Notes: GA / TIVA w/ Antiemetic    Ready For Surgery From Anesthesia Perspective.     .

## 2022-07-13 NOTE — DISCHARGE SUMMARY
[facility] - Periop Services  Discharge Note  Short Stay    Procedure(s) (LRB):  INJECTION, STEROID, SPINE, CERVICAL, EPIDURAL - C7-T1 SLIM (N/A)    OUTCOME: Patient tolerated treatment/procedure well without complication and is now ready for discharge.    DISPOSITION: Home or Self Care    FINAL DIAGNOSIS:  <principal problem not specified>    FOLLOWUP: In clinic    DISCHARGE INSTRUCTIONS:  No discharge procedures on file.     TIME SPENT ON DISCHARGE: 5 minutes

## 2022-07-14 VITALS
HEIGHT: 70 IN | OXYGEN SATURATION: 98 % | BODY MASS INDEX: 36.77 KG/M2 | WEIGHT: 256.81 LBS | SYSTOLIC BLOOD PRESSURE: 107 MMHG | HEART RATE: 68 BPM | DIASTOLIC BLOOD PRESSURE: 76 MMHG | TEMPERATURE: 98 F | RESPIRATION RATE: 20 BRPM

## 2022-07-29 ENCOUNTER — OFFICE VISIT (OUTPATIENT)
Dept: ORTHOPEDICS | Facility: CLINIC | Age: 57
End: 2022-07-29
Payer: OTHER GOVERNMENT

## 2022-07-29 VITALS
BODY MASS INDEX: 36.65 KG/M2 | HEIGHT: 70 IN | HEART RATE: 74 BPM | WEIGHT: 256 LBS | DIASTOLIC BLOOD PRESSURE: 92 MMHG | SYSTOLIC BLOOD PRESSURE: 126 MMHG

## 2022-07-29 DIAGNOSIS — M48.02 CERVICAL SPINAL STENOSIS: ICD-10-CM

## 2022-07-29 DIAGNOSIS — M54.2 CERVICALGIA: ICD-10-CM

## 2022-07-29 DIAGNOSIS — M54.12 CERVICAL RADICULITIS: Primary | ICD-10-CM

## 2022-07-29 DIAGNOSIS — M50.20 CERVICAL DISC DISPLACEMENT: ICD-10-CM

## 2022-07-29 PROCEDURE — 99213 PR OFFICE/OUTPT VISIT, EST, LEVL III, 20-29 MIN: ICD-10-PCS | Mod: ,,, | Performed by: ANESTHESIOLOGY

## 2022-07-29 PROCEDURE — 99213 OFFICE O/P EST LOW 20 MIN: CPT | Mod: ,,, | Performed by: ANESTHESIOLOGY

## 2022-07-29 NOTE — PROGRESS NOTES
Petra Burkett MD        PATIENT NAME: Tani Mehta Jr.  : 1965  DATE: 22  MRN: [id number]      Billing Provider: Petra Burkett MD  Level of Service:   Patient PCP Information     Provider PCP Type    Ramón Granda MD General          Reason for Visit / Chief Complaint: Follow-up (2 wk post C7-T1 SLIM, 22. Pt states improvement after inj, c/o pain in left shoulder; back pain comes & goes; physical therapy next week. Pain level 5 out of 10. )       Update PCP  Update Chief Complaint         History of Present Illness / Problem Focused Workflow     Tani Mehta Jr. presents to the clinic with Follow-up (2 wk post C7-T1 SLIM, 22. Pt states improvement after inj, c/o pain in left shoulder; back pain comes & goes; physical therapy next week. Pain level 5 out of 10. )     Neck pain for 3 years, no injury  Now radiating down LUE with numbness and tingling into left hand and all fingers; every now and then down RUE  OTC oral NSAIDs, topical Aleve, and Voltaren gel have not helped  Heating pad helps some    He underwent his second SLIM a couple weeks ago on  and has been getting some relief since then.  He states that the neck pain and pain between his shoulder blades is significantly improved, but he still c/o left shoulder pain and difficulty with range of motion.  Also still some numbness and tingling radiating down the LUE.  Starts PT at [facility] on Tuesday.    Follow-up  Associated symptoms include neck pain.   Back Pain        Review of Systems     Review of Systems   Musculoskeletal: Positive for back pain, neck pain and neck stiffness.        Medical / Social / Family History     Past Medical History:   Diagnosis Date    Adrenal adenoma     Diverticulosis     DJD (degenerative joint disease)     Fatty liver     Hepatic cyst     HTN (hypertension) 2014    Hyperlipidemia     Hypertension     Hypothyroidism     IBS (irritable bowel syndrome)     Sleep apnea         Past Surgical History:   Procedure Laterality Date    CATARACT EXTRACTION EXTRACAPSULAR W/ INTRAOCULAR LENS IMPLANTATION Bilateral     COLONOSCOPY      EPIDURAL STEROID INJECTION INTO CERVICAL SPINE N/A 6/8/2022    Procedure: Injection-steroid-epidural-cervical;  Surgeon: Petra Burkett MD;  Location: [facility] OR;  Service: Pain Management;  Laterality: N/A;  C7-T1 SLIM    EPIDURAL STEROID INJECTION INTO CERVICAL SPINE N/A 7/13/2022    Procedure: INJECTION, STEROID, SPINE, CERVICAL, EPIDURAL - C7-T1 SLIM;  Surgeon: Petra Burkett MD;  Location: [facility] OR;  Service: Pain Management;  Laterality: N/A;    MOUTH SURGERY      dental implants       Social History  Mr. Mehta  reports that he has quit smoking. He has never used smokeless tobacco. He reports current alcohol use of about 12.0 standard drinks of alcohol per week. He reports that he does not use drugs.    Family History  Mr.'s Mehta family history includes Bladder Cancer in his brother; Breast cancer in his mother; Cancer in his mother; Heart disease in his father, maternal grandfather, and paternal grandmother; Hypertension in his brother, father, maternal grandfather, mother, paternal grandfather, and paternal grandmother; Lung cancer in his paternal grandfather; Multiple myeloma in his father; Parkinsonism in his mother; Pulmonary fibrosis in his maternal grandfather; Stroke in his maternal grandmother; Thyroid disease in his mother.    Medications and Allergies     Medications  Outpatient Medications Marked as Taking for the 7/29/22 encounter (Office Visit) with Petra Burkett MD   Medication Sig Dispense Refill    amlodipine (NORVASC) 10 MG tablet Take 10 mg by mouth once daily.  6    amLODIPine (NORVASC) 10 MG tablet Take 10 mg by mouth.      ATENOLOL ORAL Take 50 mg by mouth.      fenofibrate (TRICOR) 145 MG tablet Take 1,800 mg by mouth.      levothyroxine (SYNTHROID) 50 MCG tablet Take 50 mcg by mouth once daily.  4    pantoprazole  (PROTONIX) 40 MG tablet Take 40 mg by mouth.      rosuvastatin (CRESTOR) 40 MG Tab Take 20 mg by mouth.         Allergies  Review of patient's allergies indicates:  No Known Allergies    Physical Examination     Vitals:    07/29/22 1011   BP: (!) 126/92   Pulse: 74     Spine Musculoskeletal Exam    General        Constitutional: appears stated age, well-developed and well-nourished    Scleral icterus: no    Labored breathing: no    Psychiatric: normal mood and affect and no acute distress    Neurological: alert and oriented x3    Skin: intact    Inspection        Cervical Spine      Cervical spine inspection is normal.    Palpation      Cervical Spine      Masses: none    Spasms: none    Crepitus: none    Upper extremity muscle tone: normal    Tenderness: present      Spinous process: mid cervical and lower cervical    Range of Motion      Cervical Spine      Cervical flexion: 1-2 finger breadths    Cervical flexion - associated detail: pain    Cervical extension: reduced extension (51-75%)    Cervical extension - associated detail: pain    Strength      Cervical Spine      Cervical spine motor exam is normal.    Sensory      Cervical Spine      Cervical spine sensation is normal.       Assessment and Plan (including Health Maintenance)      Problem List  Smart Sets  Document Outside HM   :    Plan:   Cervical radiculitis    Cervical spinal stenosis    Cervical disc displacement    Cervicalgia       Doing better after SLIM 2 weeks ago. Will start PT next week and f/u here in 2 months.    Problem List Items Addressed This Visit        Orthopedic Problems    Cervical disc displacement       Other    Cervical radiculitis - Primary    Cervicalgia    Cervical spinal stenosis            Future Appointments   Date Time Provider Department Center   9/29/2022 10:15 AM Petra Burkett MD [facility]        There are no Patient Instructions on file for this visit.  No follow-ups on file.     Signature:  Petra ENRIQUEZ  MD [place]      Date of encounter: 7/29/22

## 2022-09-29 ENCOUNTER — OFFICE VISIT (OUTPATIENT)
Dept: ORTHOPEDICS | Facility: CLINIC | Age: 57
End: 2022-09-29
Payer: OTHER GOVERNMENT

## 2022-09-29 VITALS
WEIGHT: 256 LBS | HEIGHT: 70 IN | HEART RATE: 76 BPM | BODY MASS INDEX: 36.65 KG/M2 | SYSTOLIC BLOOD PRESSURE: 137 MMHG | DIASTOLIC BLOOD PRESSURE: 92 MMHG

## 2022-09-29 DIAGNOSIS — M50.20 CERVICAL DISC DISPLACEMENT: ICD-10-CM

## 2022-09-29 DIAGNOSIS — M54.12 CERVICAL RADICULITIS: Primary | ICD-10-CM

## 2022-09-29 DIAGNOSIS — M54.2 CERVICALGIA: ICD-10-CM

## 2022-09-29 DIAGNOSIS — M48.02 CERVICAL SPINAL STENOSIS: ICD-10-CM

## 2022-09-29 PROCEDURE — 99213 OFFICE O/P EST LOW 20 MIN: CPT | Mod: ,,, | Performed by: ANESTHESIOLOGY

## 2022-09-29 PROCEDURE — 99213 PR OFFICE/OUTPT VISIT, EST, LEVL III, 20-29 MIN: ICD-10-PCS | Mod: ,,, | Performed by: ANESTHESIOLOGY

## 2022-09-29 NOTE — PROGRESS NOTES
Petra Burkett MD        PATIENT NAME: Tani Mehta Jr.  : 1965  DATE: 22  MRN: [id number]      Billing Provider: Petra Burkett MD  Level of Service:   Patient PCP Information       Provider PCP Type    Ramón Granda MD General            Reason for Visit / Chief Complaint: Back Pain (Patient presents for intermittent pain in neck and back and shoulders at this time. States sometimes his arm  goes numb, and at times still getting pain in the middle of his spine. States doing Physical therapy and its helping. )       Update PCP  Update Chief Complaint         History of Present Illness / Problem Focused Workflow     Tani Mehta Jr. presents to the clinic with Back Pain (Patient presents for intermittent pain in neck and back and shoulders at this time. States sometimes his arm  goes numb, and at times still getting pain in the middle of his spine. States doing Physical therapy and its helping. )     This is a 57-year-old male who presents to clinic today for follow-up of his chronic neck pain radiating down the left upper extremity.  He also complains of numbness radiating down his left arm to his hand and fingers.  He has undergone 2 cervical epidural steroid injections, in  and July of this year.  He is in physical therapy for the past couple of months, and states that it is helping a little bit.  However, his symptoms are still present.  He actually had to quit 1 of his jobs because he was unable to do the physical activity required.  He also has a hard time doing yard work at home.        Follow-up  Associated symptoms include neck pain.   Back Pain      Review of Systems     Review of Systems   Musculoskeletal:  Positive for back pain, neck pain and neck stiffness.      Medical / Social / Family History     Past Medical History:   Diagnosis Date    Adrenal adenoma     Diverticulosis     DJD (degenerative joint disease)     Fatty liver     Hepatic cyst     HTN (hypertension)  12/08/2014    Hyperlipidemia     Hypertension     Hypothyroidism     IBS (irritable bowel syndrome)     Sleep apnea        Past Surgical History:   Procedure Laterality Date    CATARACT EXTRACTION EXTRACAPSULAR W/ INTRAOCULAR LENS IMPLANTATION Bilateral     COLONOSCOPY      EPIDURAL STEROID INJECTION INTO CERVICAL SPINE N/A 6/8/2022    Procedure: Injection-steroid-epidural-cervical;  Surgeon: Petra Burkett MD;  Location: [facility] OR;  Service: Pain Management;  Laterality: N/A;  C7-T1 SLIM    EPIDURAL STEROID INJECTION INTO CERVICAL SPINE N/A 7/13/2022    Procedure: INJECTION, STEROID, SPINE, CERVICAL, EPIDURAL - C7-T1 SLIM;  Surgeon: Petra Burkett MD;  Location: [facility] OR;  Service: Pain Management;  Laterality: N/A;    MOUTH SURGERY      dental implants       Social History  Mr. Mehta  reports that he has quit smoking. He has never used smokeless tobacco. He reports current alcohol use of about 12.0 standard drinks per week. He reports that he does not use drugs.    Family History  Mr.'s Mehta family history includes Bladder Cancer in his brother; Breast cancer in his mother; Cancer in his mother; Heart disease in his father, maternal grandfather, and paternal grandmother; Hypertension in his brother, father, maternal grandfather, mother, paternal grandfather, and paternal grandmother; Lung cancer in his paternal grandfather; Multiple myeloma in his father; Parkinsonism in his mother; Pulmonary fibrosis in his maternal grandfather; Stroke in his maternal grandmother; Thyroid disease in his mother.    Medications and Allergies     Medications  Outpatient Medications Marked as Taking for the 9/29/22 encounter (Office Visit) with Petra Burkett MD   Medication Sig Dispense Refill    amlodipine (NORVASC) 10 MG tablet Take 10 mg by mouth once daily.  6    ATENOLOL ORAL Take 50 mg by mouth.      fenofibrate (TRICOR) 145 MG tablet Take 1,800 mg by mouth.      levothyroxine (SYNTHROID) 50 MCG tablet Take 50 mcg by  mouth once daily.  4    pantoprazole (PROTONIX) 40 MG tablet Take 40 mg by mouth.      rosuvastatin (CRESTOR) 40 MG Tab Take 20 mg by mouth.         Allergies  Review of patient's allergies indicates:  No Known Allergies    Physical Examination     Vitals:    09/29/22 1002   BP: (!) 137/92   Pulse: 76     Spine Musculoskeletal Exam    Inspection    Cervical Spine    Cervical spine inspection is normal.    Palpation    Cervical Spine    Tenderness: present      Spinous process: mid cervical and lower cervical    Range of Motion    Cervical Spine       Cervical flexion: 1-2 finger breadths. Cervical flexion detail: pain.     Cervical extension: reduced extension (51-75%). Cervical extension detail: pain.      Strength    Cervical Spine    Cervical spine motor exam is normal.    Sensory    Cervical Spine    Cervical spine sensation is normal.    General      Constitutional: appears stated age, well-developed and well-nourished    Scleral icterus: no    Labored breathing: no    Psychiatric: normal mood and affect and no acute distress    Neurological: alert and oriented x3    Skin: intact     Assessment and Plan (including Health Maintenance)      Problem List  Smart Sets  Document Outside HM   :    Plan:   Cervical radiculitis    Cervical spinal stenosis    Cervical disc displacement    Cervicalgia     He has been seeing some slight improvement with physical therapy.  I am going to schedule him for the 3rd and final cervical epidural steroid injection today.  I am putting in a referral for physical therapy as well.  We discussed that if he is still not having significant improvement after this, we will likely refer him to a spine surgeon.  However, he would like to exhaust all conservative options prior to considering surgery.    Problem List Items Addressed This Visit          Orthopedic Problems    Cervical disc displacement       Other    Cervical radiculitis - Primary    Cervicalgia    Cervical spinal stenosis          No future appointments.       There are no Patient Instructions on file for this visit.  No follow-ups on file.     Signature:  Petra Burkett MD      Date of encounter: 9/29/22

## 2023-09-07 ENCOUNTER — PATIENT MESSAGE (OUTPATIENT)
Dept: RESEARCH | Facility: HOSPITAL | Age: 58
End: 2023-09-07
Payer: OTHER GOVERNMENT

## (undated) DEVICE — SYR POSIFLUSH NACL PREFIL 10ML

## (undated) DEVICE — SYR EPILOR LUER-LOK LOR 7ML

## (undated) DEVICE — SYR 10CC LUER LOCK

## (undated) DEVICE — Device

## (undated) DEVICE — GLOVE PROTEXIS PI CRM 6.5

## (undated) DEVICE — POSITIONER HEAD ADULT

## (undated) DEVICE — NDL EPIDURAL TOUHY 18G X3.5

## (undated) DEVICE — APPLICATOR CHLORAPREP ORN 26ML

## (undated) DEVICE — SET SMARTSITE EXT SMALLBORE NF

## (undated) DEVICE — DRAPE TOWEL TIBURON 19X30IN

## (undated) DEVICE — NDL BLUNT FILL 18G 1IN

## (undated) DEVICE — SYR 3CC LUER LOC

## (undated) DEVICE — KIT SURGICAL TURNOVER

## (undated) DEVICE — SHEET DRAPE MEDIUM

## (undated) DEVICE — NDL FLTR 5MCRN BLNT TIP 18GX1

## (undated) DEVICE — BANDAGE SHEER STRIP 3/4X3IN

## (undated) DEVICE — BANDAGE ADHESIVE FABRIC 2X4

## (undated) DEVICE — SEE MEDLINE ITEM 146410

## (undated) DEVICE — SYR DISP LL 5CC